# Patient Record
(demographics unavailable — no encounter records)

---

## 2025-03-17 NOTE — HISTORY OF PRESENT ILLNESS
[Currently Active] : currently active [Men] : men [Vaginal] : vaginal [No] : No [FreeTextEntry1] : 41 yo  doing well reg menses not on oc .H/o TL. Mo just dx with uterine cancer age 63. She denies family h/o breast, or colon cancer.